# Patient Record
Sex: MALE | Race: ASIAN | NOT HISPANIC OR LATINO | URBAN - METROPOLITAN AREA
[De-identification: names, ages, dates, MRNs, and addresses within clinical notes are randomized per-mention and may not be internally consistent; named-entity substitution may affect disease eponyms.]

---

## 2019-04-05 ENCOUNTER — EMERGENCY (EMERGENCY)
Facility: HOSPITAL | Age: 44
LOS: 1 days | Discharge: LEFT BEFORE DISCHARGE | End: 2019-04-05
Admitting: EMERGENCY MEDICINE

## 2019-04-05 NOTE — ED ADULT NURSE NOTE - EXPLANATION OF PATIENT'S REASON FOR LEAVING
pt states " I want to go home and get copies of my medical records and MRI report and I will come back later". Pt appears in no acute distress.

## 2019-07-08 ENCOUNTER — EMERGENCY (EMERGENCY)
Facility: HOSPITAL | Age: 44
LOS: 1 days | Discharge: HOME | End: 2019-07-08
Admitting: EMERGENCY MEDICINE
Payer: COMMERCIAL

## 2019-07-08 VITALS
HEART RATE: 67 BPM | DIASTOLIC BLOOD PRESSURE: 90 MMHG | RESPIRATION RATE: 16 BRPM | SYSTOLIC BLOOD PRESSURE: 131 MMHG | TEMPERATURE: 98 F | OXYGEN SATURATION: 100 %

## 2019-07-08 PROCEDURE — 99283 EMERGENCY DEPT VISIT LOW MDM: CPT

## 2019-07-08 RX ORDER — CYCLOBENZAPRINE HYDROCHLORIDE 10 MG/1
1 TABLET, FILM COATED ORAL
Qty: 12 | Refills: 0
Start: 2019-07-08 | End: 2019-07-11

## 2019-07-08 NOTE — ED PROVIDER NOTE - NSFOLLOWUPINSTRUCTIONS_ED_ALL_ED_FT
Limit further injury, over exertion, and rest affected area. Cyclobenzaprine 1 tablet every 8 hrs as needed for muscle spasms - caution drowsiness while taking this medication- do not drive or operate heavy machinery. Take motrin 600mg every 6h as needed for pain. Please continue all home medications as directed. See your regular doctor within 72 hours for follow-up care. Return to ER for new or worsening symptoms.

## 2019-07-08 NOTE — ED PROVIDER NOTE - OBJECTIVE STATEMENT
44 year old male with a PMHx of ?partial gluteal tear pw left sided hip/buttock pain for several months. Pt states that he is visiting from the [facility] and had and orthopedic workup [place] which he was told could have been a partial gluteal tear but is still having intermittent discomfort. 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 Pt ambulatory without assistance. Denies n/v/f/c, CP, SOB, abdominal pain, dysuria, hematuria, melena, hematochezia, diarrhea, constipation, back pain, numbness/weakness/tingling in the extremities.

## 2019-07-08 NOTE — ED PROVIDER NOTE - CLINICAL SUMMARY MEDICAL DECISION MAKING FREE TEXT BOX
44 year old male with a PMHx of ?partial gluteal tear pw left sided hip/buttock pain for several months.   Plan: will give muscle relaxers for muscle strain as well as orthopedist referral